# Patient Record
Sex: FEMALE | Race: WHITE | Employment: STUDENT | URBAN - METROPOLITAN AREA
[De-identification: names, ages, dates, MRNs, and addresses within clinical notes are randomized per-mention and may not be internally consistent; named-entity substitution may affect disease eponyms.]

---
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
## 2022-10-13 ENCOUNTER — HOSPITAL ENCOUNTER (EMERGENCY)
Age: 22
Discharge: HOME | End: 2022-10-13
Attending: STUDENT IN AN ORGANIZED HEALTH CARE EDUCATION/TRAINING PROGRAM

## 2022-10-13 VITALS
DIASTOLIC BLOOD PRESSURE: 74 MMHG | RESPIRATION RATE: 16 BRPM | HEART RATE: 90 BPM | TEMPERATURE: 98.6 F | OXYGEN SATURATION: 99 % | SYSTOLIC BLOOD PRESSURE: 117 MMHG

## 2022-10-13 DIAGNOSIS — T15.91XA FOREIGN BODY OF RIGHT EYE, INITIAL ENCOUNTER: Primary | ICD-10-CM

## 2022-10-13 PROCEDURE — 99284 EMERGENCY DEPT VISIT MOD MDM: CPT

## 2022-10-13 ASSESSMENT — PAIN SCALES - GENERAL: PAINLEVEL_OUTOF10: 2

## (undated) NOTE — ED AVS SNAPSHOT
[facility] in [address]    [address] [facility]    Phone:  [phone]    Fax:  [phone]   MRN: [id number]    Department:  THE [facility] in [address] [place] [address],[address]   Date of Visit:  1/30/2017 please return or follow-up with primary care physician to consider oral antibiotic    Discharge References/Attachments     CONTACT DERMATITIS (ENGLISH)    BLEPHARITIS (ENGLISH)      Disclosure     Insurance plans vary and the physician(s) referred by the [facility]. Follow-up care is at the discretion of that Physician. IF THERE IS ANY CHANGE OR WORSENING OF YOUR CONDITION, CALL YOUR PRIMARY CARE PHYSICIAN AT ONCE OR GO TO THE EMERGENCY DEPARTMENT.     If you have been prescribed any medication(s), - If you don’t have insurance, Yuly Garza has partnered with Patient Jason Rue De Sante to help you get signed up for insurance coverage.   Patient Jsaon Runettie Macias Sante is a Federal Navigator program that can help with your Affordable Care Act cover